# Patient Record
Sex: FEMALE | Race: WHITE | Employment: OTHER | URBAN - METROPOLITAN AREA
[De-identification: names, ages, dates, MRNs, and addresses within clinical notes are randomized per-mention and may not be internally consistent; named-entity substitution may affect disease eponyms.]

---

## 2019-02-11 PROBLEM — H25.011 CORTICAL AGE-RELATED CATARACT OF RIGHT EYE: Status: ACTIVE | Noted: 2019-02-11

## 2019-02-12 PROBLEM — H25.011 CORTICAL AGE-RELATED CATARACT OF RIGHT EYE: Status: RESOLVED | Noted: 2019-02-11 | Resolved: 2019-02-12

## 2020-06-01 ENCOUNTER — OFFICE VISIT (OUTPATIENT)
Dept: SURGERY | Age: 75
End: 2020-06-01

## 2020-06-01 VITALS
BODY MASS INDEX: 26.92 KG/M2 | RESPIRATION RATE: 16 BRPM | OXYGEN SATURATION: 97 % | TEMPERATURE: 99 F | HEART RATE: 65 BPM | HEIGHT: 70 IN | SYSTOLIC BLOOD PRESSURE: 112 MMHG | DIASTOLIC BLOOD PRESSURE: 69 MMHG | WEIGHT: 188 LBS

## 2020-06-01 DIAGNOSIS — K43.2 INCISIONAL HERNIA, WITHOUT OBSTRUCTION OR GANGRENE: Primary | ICD-10-CM

## 2020-06-01 NOTE — PROGRESS NOTES
1. Have you been to the ER, urgent care clinic since your last visit? Hospitalized since your last visit? No    2. Have you seen or consulted any other health care providers outside of the [address] [place] [address] since your last visit? Include any pap smears or colon screening.  No

## 2020-06-01 NOTE — LETTER
6/1/20 Patient: Mikki Duenas YOB: 1945 Date of Visit: 6/1/2020 Kyler Edmonds DO 
[facility] [address] [place] [address] VIA Facsimile: [phone] Dear Kyler Edmonds DO, Thank you for referring Ms. Ra Garcia Lasting to [facility] for evaluation. My notes for this consultation are attached. If you have questions, please do not hesitate to call me. I look forward to following your patient along with you. Sincerely, Osiris Hernandez MD
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
## 2020-06-09 ENCOUNTER — DOCUMENTATION ONLY (OUTPATIENT)
Dept: SURGERY | Age: 75
End: 2020-06-09

## 2020-06-24 ENCOUNTER — TELEPHONE (OUTPATIENT)
Dept: SURGERY | Age: 75
End: 2020-06-24

## 2020-06-24 NOTE — TELEPHONE ENCOUNTER
I have reviewed the patient's CT scan and operative notes from [facility]. She does have a very complex hernia with very little to almost no muscle or abdominal wall in the left side. She would need a very complex repair and think she would be better off going to [facility] to [facility] for hernia evaluation. For right now she is working on losing some weight and is feeling much better after losing some weight and would like to continue on this path right now. She will call us when she feels ready to consider having a hernia repair. She will call us back and then I will refer her to [facility].     Dimitry Ross

## 2022-10-13 ENCOUNTER — TRANSCRIBE ORDER (OUTPATIENT)
Dept: SCHEDULING | Age: 77
End: 2022-10-13

## 2022-10-13 DIAGNOSIS — I35.0 AORTIC STENOSIS: Primary | ICD-10-CM

## 2022-10-13 DIAGNOSIS — R55 FAINTING: ICD-10-CM

## 2022-10-13 DIAGNOSIS — R42 LIGHTHEADEDNESS: ICD-10-CM

## 2022-10-14 ENCOUNTER — HOSPITAL ENCOUNTER (OUTPATIENT)
Dept: NON INVASIVE DIAGNOSTICS | Age: 77
Discharge: HOME | End: 2022-10-14
Attending: INTERNAL MEDICINE
Payer: MEDICARE

## 2022-10-14 VITALS
BODY MASS INDEX: 24.62 KG/M2 | HEIGHT: 70 IN | SYSTOLIC BLOOD PRESSURE: 145 MMHG | DIASTOLIC BLOOD PRESSURE: 74 MMHG | WEIGHT: 172 LBS

## 2022-10-14 DIAGNOSIS — I35.0 AORTIC STENOSIS: ICD-10-CM

## 2022-10-14 DIAGNOSIS — R42 LIGHTHEADEDNESS: ICD-10-CM

## 2022-10-14 DIAGNOSIS — R55 FAINTING: ICD-10-CM

## 2022-10-14 LAB
ECHO AV AREA PEAK VELOCITY: 1.5 CM2
ECHO AV AREA PLAN/BSA: 1.28 CM2/M2
ECHO AV AREA PLAN: 2.5 CM2
ECHO AV AREA VTI: 1.5 CM2
ECHO AV AREA/BSA PEAK VELOCITY: 0.8 CM2/M2
ECHO AV AREA/BSA VTI: 0.8 CM2/M2
ECHO AV MEAN GRADIENT: 5 MMHG
ECHO AV MEAN VELOCITY: 1 M/S
ECHO AV PEAK GRADIENT: 11 MMHG
ECHO AV PEAK VELOCITY: 1.7 M/S
ECHO AV VELOCITY RATIO: 1
ECHO AV VTI: 35.1 CM
ECHO LA DIAMETER INDEX: 1.68 CM/M2
ECHO LA DIAMETER: 3.3 CM
ECHO LA VOL 4C: 42 ML (ref 22–52)
ECHO LA VOLUME INDEX A4C: 21 ML/M2 (ref 16–34)
ECHO LV E' LATERAL VELOCITY: 3 CM/S
ECHO LV E' SEPTAL VELOCITY: 3 CM/S
ECHO LV EDV A2C: 92 ML
ECHO LV EDV A4C: 78 ML
ECHO LV EDV BP: 88 ML (ref 56–104)
ECHO LV EDV INDEX A4C: 40 ML/M2
ECHO LV EDV INDEX BP: 45 ML/M2
ECHO LV EDV NDEX A2C: 47 ML/M2
ECHO LV EJECTION FRACTION A2C: 92 %
ECHO LV EJECTION FRACTION A4C: 58 %
ECHO LV EJECTION FRACTION BIPLANE: 81 % (ref 55–100)
ECHO LV ESV A2C: 7 ML
ECHO LV ESV A4C: 33 ML
ECHO LV ESV BP: 16 ML (ref 19–49)
ECHO LV ESV INDEX A2C: 4 ML/M2
ECHO LV ESV INDEX A4C: 17 ML/M2
ECHO LV ESV INDEX BP: 8 ML/M2
ECHO LV FRACTIONAL SHORTENING: 28 % (ref 28–44)
ECHO LV INTERNAL DIMENSION DIASTOLE INDEX: 1.28 CM/M2
ECHO LV INTERNAL DIMENSION DIASTOLIC: 2.5 CM (ref 3.9–5.3)
ECHO LV INTERNAL DIMENSION SYSTOLIC INDEX: 0.92 CM/M2
ECHO LV INTERNAL DIMENSION SYSTOLIC: 1.8 CM
ECHO LV IVSD: 1.6 CM (ref 0.6–0.9)
ECHO LV MASS 2D: 141.7 G (ref 67–162)
ECHO LV MASS INDEX 2D: 72.3 G/M2 (ref 43–95)
ECHO LV POSTERIOR WALL DIASTOLIC: 1.6 CM (ref 0.6–0.9)
ECHO LV RELATIVE WALL THICKNESS RATIO: 1.28
ECHO LVOT AREA: 1.5 CM2
ECHO LVOT AV VTI INDEX: 1.03
ECHO LVOT DIAM: 1.4 CM
ECHO LVOT MEAN GRADIENT: 6 MMHG
ECHO LVOT PEAK GRADIENT: 12 MMHG
ECHO LVOT PEAK VELOCITY: 1.7 M/S
ECHO LVOT STROKE VOLUME INDEX: 28.3 ML/M2
ECHO LVOT SV: 55.4 ML
ECHO LVOT VTI: 36 CM
ECHO MV A VELOCITY: 1.75 M/S
ECHO MV AREA PHT: 9 CM2
ECHO MV AREA VTI: 1.8 CM2
ECHO MV E DECELERATION TIME (DT): 72 MS
ECHO MV E VELOCITY: 0.87 M/S
ECHO MV E/A RATIO: 0.5
ECHO MV E/E' LATERAL: 29
ECHO MV E/E' RATIO (AVERAGED): 29
ECHO MV E/E' SEPTAL: 29
ECHO MV LVOT VTI INDEX: 0.85
ECHO MV MAX VELOCITY: 1.9 M/S
ECHO MV MEAN GRADIENT: 7 MMHG
ECHO MV MEAN VELOCITY: 1.2 M/S
ECHO MV PEAK GRADIENT: 14 MMHG
ECHO MV PRESSURE HALF TIME (PHT): 24.4 MS
ECHO MV REGURGITANT PEAK GRADIENT: 112 MMHG
ECHO MV REGURGITANT PEAK VELOCITY: 5.3 M/S
ECHO MV VTI: 30.5 CM
ECHO RV INTERNAL DIMENSION: 3.4 CM
ECHO TV REGURGITANT MAX VELOCITY: 2.37 M/S
ECHO TV REGURGITANT PEAK GRADIENT: 24 MMHG

## 2022-10-14 PROCEDURE — 93306 TTE W/DOPPLER COMPLETE: CPT

## 2022-12-29 ENCOUNTER — OFFICE VISIT (OUTPATIENT)
Dept: CARDIOLOGY CLINIC | Age: 77
End: 2022-12-29
Payer: MEDICARE

## 2022-12-29 VITALS
OXYGEN SATURATION: 98 % | SYSTOLIC BLOOD PRESSURE: 139 MMHG | HEIGHT: 70 IN | BODY MASS INDEX: 25.2 KG/M2 | DIASTOLIC BLOOD PRESSURE: 71 MMHG | HEART RATE: 82 BPM | WEIGHT: 176 LBS | RESPIRATION RATE: 17 BRPM

## 2022-12-29 DIAGNOSIS — R06.02 SOB (SHORTNESS OF BREATH): ICD-10-CM

## 2022-12-29 DIAGNOSIS — R55 SYNCOPE, UNSPECIFIED SYNCOPE TYPE: Primary | ICD-10-CM

## 2022-12-29 DIAGNOSIS — R00.0 TACHYCARDIA: ICD-10-CM

## 2022-12-29 PROCEDURE — 99204 OFFICE O/P NEW MOD 45 MIN: CPT | Performed by: SPECIALIST

## 2022-12-29 PROCEDURE — 1090F PRES/ABSN URINE INCON ASSESS: CPT | Performed by: SPECIALIST

## 2022-12-29 RX ORDER — TOPIRAMATE 50 MG/1
1 TABLET, FILM COATED ORAL
COMMUNITY
Start: 2022-11-09 | End: 2022-12-29 | Stop reason: ALTCHOICE

## 2022-12-29 RX ORDER — DICLOFENAC SODIUM 10 MG/G
GEL TOPICAL
COMMUNITY

## 2022-12-29 RX ORDER — HYDROCODONE BITARTRATE AND ACETAMINOPHEN 10; 325 MG/1; MG/1
1 TABLET ORAL
COMMUNITY

## 2022-12-29 RX ORDER — TRAZODONE HYDROCHLORIDE 100 MG/1
TABLET ORAL
COMMUNITY

## 2022-12-29 RX ORDER — METOPROLOL SUCCINATE 25 MG/1
12.5 TABLET, EXTENDED RELEASE ORAL DAILY
COMMUNITY
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
## 2023-01-03 ENCOUNTER — HOSPITAL ENCOUNTER (OUTPATIENT)
Dept: VASCULAR SURGERY | Age: 78
Discharge: HOME | End: 2023-01-03
Attending: SPECIALIST
Payer: MEDICARE

## 2023-01-03 DIAGNOSIS — R55 SYNCOPE, UNSPECIFIED SYNCOPE TYPE: ICD-10-CM

## 2023-01-03 DIAGNOSIS — R00.0 TACHYCARDIA: ICD-10-CM

## 2023-01-03 DIAGNOSIS — R06.02 SOB (SHORTNESS OF BREATH): ICD-10-CM

## 2023-01-03 NOTE — PROGRESS NOTES
21 day event monitor placed on patient. Patient educated on device and extra supplies provided. No questions at this time.      Monitor # [id number]

## 2023-01-10 ENCOUNTER — TELEPHONE (OUTPATIENT)
Dept: CARDIOLOGY CLINIC | Age: 78
End: 2023-01-10

## 2023-01-10 NOTE — TELEPHONE ENCOUNTER
Its ok if she skips metoprolol when bp too low.  We will need to wait for results of monitor before making any additional recommendations

## 2023-01-10 NOTE — TELEPHONE ENCOUNTER
Cindy ([place]) called from [facility] regarding patient having SOB, low B/P, and irritation with medication-  Sabrina Pereira and told her [facility] ([place] would forward her a message-        Thanks

## 2023-01-10 NOTE — TELEPHONE ENCOUNTER
Pt is having low bp readings, and shortness of breath, and negative side effects to her medication. Pt would like a call back from the nurse to discuss.      Pt # [phone]

## 2023-01-10 NOTE — TELEPHONE ENCOUNTER
I called and spoke with the patient, two identifiers verified. I have notified patient about Dr. Hyun Orourke message below: Its ok if she skips metoprolol when bp too low. We will need to wait for results of monitor before making any additional recommendations. I advised patient to monitor her BP and HR before taking Metoprolol, if BP reading is low, skip Metoprolol.

## 2023-01-10 NOTE — TELEPHONE ENCOUNTER
I called and spoke with patient, two identifiers verified. She stated that she stopped taking her Metoprolol 12.5 mg 1 to 2 tablets daily since she had the cardiac monitor on because she wanted for her and us  to check how her heart functions without the Metoprolol. According to her, she noticed fatigued, heart rate constantly elevated (over 100), most recent BP Is 122/73. She feels short of breath when walking, and chest feels tight when heart rate is racing. I have suggested to take Metoprolol as prescribed but stated that Metoprolol does not seem like the right drug for her because it only reduces her heart rate a little, and drops her blood pressure to 90/56. She's mentioned that she was taking Carvedilol then Atenolol which was working well, but was switched to Metoprolol to protect her kidneys. Most recent BP: 122/73; Resting HR: 102 bpm.    I advised to reduce/ avoid caffeine consumption, and drink plenty of water for hydration. She is currently taking OTC electrolyte drink to hydrate. I have reminded her to record symptoms thru her cardiac monitor.

## 2023-02-01 ENCOUNTER — TELEPHONE (OUTPATIENT)
Dept: CARDIOLOGY CLINIC | Age: 78
End: 2023-02-01

## 2023-02-01 NOTE — TELEPHONE ENCOUNTER
----- Message from Piper Garcia MD sent at 1/31/2023  4:24 PM EST -----  Few skipped beats, but no significant fast or slow heart rates. Nothing dangerous and symptoms did not correlate with skipped beats. looks good

## 2023-02-01 NOTE — TELEPHONE ENCOUNTER
I called patient to discuss about the result of her Cardiac Monitor. No answer. I left a VM requesting for call back.

## 2023-02-02 NOTE — TELEPHONE ENCOUNTER
I called and spoke with the patient, two identifiers verified. I have notified her about the result of her Cardiac Monitor:  ----- Message from [address], MD sent at 1/31/2023  4:24 PM EST -----  \"Few skipped beats, but no significant fast or slow heart rates. Nothing dangerous and symptoms did not correlate with skipped beats. looks good\"

## 2023-02-07 ENCOUNTER — OFFICE VISIT (OUTPATIENT)
Dept: CARDIOLOGY CLINIC | Age: 78
End: 2023-02-07
Payer: MEDICARE

## 2023-02-07 VITALS
HEART RATE: 75 BPM | OXYGEN SATURATION: 98 % | HEIGHT: 70 IN | BODY MASS INDEX: 25.77 KG/M2 | SYSTOLIC BLOOD PRESSURE: 118 MMHG | DIASTOLIC BLOOD PRESSURE: 62 MMHG | RESPIRATION RATE: 18 BRPM | WEIGHT: 180 LBS

## 2023-02-07 DIAGNOSIS — R42 LIGHTHEADEDNESS: ICD-10-CM

## 2023-02-07 DIAGNOSIS — R00.0 TACHYCARDIA: Primary | ICD-10-CM

## 2023-02-07 DIAGNOSIS — R06.02 SOB (SHORTNESS OF BREATH): ICD-10-CM

## 2023-02-07 PROCEDURE — 1123F ACP DISCUSS/DSCN MKR DOCD: CPT | Performed by: SPECIALIST

## 2023-02-07 PROCEDURE — 99213 OFFICE O/P EST LOW 20 MIN: CPT | Performed by: SPECIALIST

## 2023-02-07 PROCEDURE — G8536 NO DOC ELDER MAL SCRN: HCPCS | Performed by: SPECIALIST

## 2023-02-07 PROCEDURE — G8399 PT W/DXA RESULTS DOCUMENT: HCPCS | Performed by: SPECIALIST

## 2023-02-07 PROCEDURE — 1090F PRES/ABSN URINE INCON ASSESS: CPT | Performed by: SPECIALIST

## 2023-02-07 PROCEDURE — 3017F COLORECTAL CA SCREEN DOC REV: CPT | Performed by: SPECIALIST

## 2023-02-07 PROCEDURE — 1101F PT FALLS ASSESS-DOCD LE1/YR: CPT | Performed by: SPECIALIST

## 2023-02-07 PROCEDURE — G8417 CALC BMI ABV UP PARAM F/U: HCPCS | Performed by: SPECIALIST

## 2023-02-07 PROCEDURE — G8510 SCR DEP NEG, NO PLAN REQD: HCPCS | Performed by: SPECIALIST

## 2023-02-07 PROCEDURE — G8427 DOCREV CUR MEDS BY ELIG CLIN: HCPCS | Performed by: SPECIALIST
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
